# Patient Record
Sex: MALE | Race: WHITE | NOT HISPANIC OR LATINO | URBAN - METROPOLITAN AREA
[De-identification: names, ages, dates, MRNs, and addresses within clinical notes are randomized per-mention and may not be internally consistent; named-entity substitution may affect disease eponyms.]

---

## 2021-04-08 ENCOUNTER — OFFICE VISIT (OUTPATIENT)
Dept: URBAN - METROPOLITAN AREA CLINIC 98 | Facility: CLINIC | Age: 31
End: 2021-04-08

## 2021-04-08 DIAGNOSIS — R10.84 GENERALIZED ABDOMINAL PAIN: ICD-10-CM

## 2021-04-08 DIAGNOSIS — K50.80 CROHN'S COLITIS: ICD-10-CM

## 2021-04-08 DIAGNOSIS — R11.2 NAUSEA & VOMITING: ICD-10-CM

## 2021-04-08 DIAGNOSIS — R19.7 DIARRHEA, UNSPECIFIED TYPE: ICD-10-CM

## 2021-04-08 PROCEDURE — 99215 OFFICE O/P EST HI 40 MIN: CPT | Performed by: INTERNAL MEDICINE

## 2021-04-08 RX ORDER — USTEKINUMAB 90 MG/ML
INJECT 1 MILLILITER (90 MG) BY SUBCUTANEOUS ROUTE EVERY 8 WEEKS INJECTION, SOLUTION SUBCUTANEOUS
Qty: 1 | Refills: 5 | Status: ACTIVE | COMMUNITY
Start: 1900-01-01 | End: 1900-01-01

## 2021-04-08 RX ORDER — METHYLPREDNISOLONE 4 MG
TAKE AS DIRECTED TABLET, DOSE PACK ORAL
Qty: 1 | Refills: 0 | Status: ACTIVE | COMMUNITY
Start: 1900-01-01 | End: 1900-01-01

## 2021-04-08 RX ORDER — DICYCLOMINE HYDROCHLORIDE 10 MG/1
TAKE 1 CAPSULE (10 MG) BY ORAL ROUTE 2 TIMES PER DAY CAPSULE ORAL
Qty: 0 | Refills: 0 | Status: ACTIVE | COMMUNITY
Start: 1900-01-01 | End: 1900-01-01

## 2021-04-08 NOTE — HPI-TODAY'S VISIT:
29 yo male comes in for Crohn's f/u. Diarrhea, nausea, regurg, some pain, hemorroids. Started Stelara late 2017 after ileocecal resection.  Has [employer] insurance now.  10-12 stools a day  Trying to get a job.  Lives an hour and a half away.'  Last colonoscopy mid 2019.  He improves with the injection and then it wears off.  A few days the week before, syx improve. If he goes several times within an hour  Flagyl ointment. Wiping  Has never used prep H or annusol supp.  Has some abdominal pain. Eats and may be a trigger.  Last had blood work/labs.  Saw Dr. Dee Agustin in [place].   Monitoring the Stelara. Gave him vit.  Never had C diff.  Got [employer] insurance.     Last injection was last Friday. Not due for 7 weeks..  Range of stooling is 10-15.  Looking for work.  Got [employer] certificate and wants  job.   =========================== 3/4/19  Follow-up Crohn's Disease    History Of Present Illness  This is a scheduled appointment for this patient, a 28 year old /White male, after a previous visit approximately Crohn's disease.     27 yo male comes in for 3 month f/u.  Doing the same on Stelara as Cimzia.  He feels worse since his surgery.  Always going to br.  She says it is normal.  Typically 4-5 stools a day.  Was having 6-7 stools a day at last visit.  Usuially no nighttime stooling.  No blood in stool.  Daily abdominal pain, he thinks from scar and surgery and not from Crohn's.  More positional.  One resection.  Never had c diff colitis.  Been on Stelara for a year and good levels--- 7.3 and 9.8.     =====  12/3/18  27 yo male comes in for 3 month f/u ov.  not in remission.  6-7 stools a dfay. No blood.  REcent lightheaded ness and dizzy. Bad pain this past weekend. Gets abd pain uncommon.  May have been due to leftovers related to Thanksgiving.  Advised to use pepto if again occur  Taking Stelara 90 mg q 8 weeks. Dicyclomine 20 mg 1-2 qid prn.  nl lacto 6.59 and calpro less than16, so likely in remission.  await other labs.  ====Plan from 12/3/18 visit ------ stelara level. 8 labs. Turned in stool tests to [facility] 7 days ago.  Unable to find work because of syx and h/o Crohn's and encourage.  Try 1-2 dicyclomine prn or just qid and see if syx improved so he can focus on getting training.  Technical school ---- get a foundation and then more.  Asked for sleep med and suggested melatonin or to go to primary care.  1. Volunteer work 2. Part time 3. Help the elderly 4. Other options.   ===========  8/27/18 6-7 stools a day. doing better but not well.  Has had left sided abdominal pain on his left side.  Has talked to Dr. Luna.  Nausea, no vomiting.  taking bentyl - suggest 1-2 bid - qid  No pred and no pain meds.  Low vit d 27--- should be taking 10,000 u a day for 6 months or so and re  Stelara level was 7 at trough and is very good  =================================  26 YO male comes in for 3 month f/u.  Doing better.  Getting Stelara every 8 weeks.  Next dosee is tomorrow.  Has a rash on upper arm which concerns him  Today some mild pain that has resolved.  7 stools a day.  But quality is improved.  1 surgery 1 year ago by Dr. Luna   wAS OFF STELARA AN EXTRA MONTH      Past Medical History  Disease Name Date Onset Notes  Abdominal pain 01/29/2018 --   Anemia unk --   Anxiety and depression unk --   Asthma unk --   Back pain unk --   Constipation unk --   Crohn disease 06/06/2017 --   Hemorrhoids unk --   Hepatitis A Immunization unk --   Hepatitis B Immunization unk --   Influenza Immunization unk --   Nausea and vomiting unk --   Obesity unk --   Other unk MMR vaccination and chicken pox vaccine  Rectal bleeding unk --   Refused Screening for Hepatitis C unk --       Past Surgical History  Procedure Name Date Notes  Colon Surgery 5/2017 --   Colonoscopy 10/24/2017 03/04/2019 - 08/02/2018 - 05/02/2018 - 11/16/2017 -   EGD 10/24/2017 03/04/2019 - 08/02/2018 - 05/02/2018 - 11/16/2017 -       Medication List  Name Date Started Instructions  dicyclomine 10 mg oral capsule  take 1 capsule (10 mg) by oral route 2 times per day  Imodium Advanced oral  --   Stelara 90 mg/mL subcutaneous syringe 06/14/2017 inject 1 milliliter (90 mg) by subcutaneous route every 8 weeks for 60 days      Allergy List  Allergen Name Date Reaction Notes  NO KNOWN DRUG ALLERGIES --  --  --   No Known Food Allergies --  --  --   Cats --  --  --   Dander --  --  --       Family Medical History  Disease Name Relative/Age Notes  Family history of Crohn's disease Grandfather (maternal)/  --       Social History  Finding Status Start/Stop Quantity Notes  Alcohol Current some day --/-- --  03/04/2019 - 12/03/2018 - 08/02/2018 - 05/02/2018 - 01/29/2018 - 11/16/2017 - 08/16/2017 - 06/14/2017 - 06/06/2017 - 05/31/2017 - 03/30/2017 - 02/02/2017 - rarely   Denies illicit substance abuse --  --/-- --  03/04/2019 -   Denies substance abuse --  --/-- --  03/04/2019 -   Tobacco Former --/-- --  03/04/2019 - 12/03/2018 - 08/02/2018 - 05/02/2018 - 01/29/2018 - 11/16/2017 - 08/16/2017 - 06/14/2017 - 06/06/2017 - 05/31/2017 - 03/30/2017 - 02/02/2017 - as a kid       Review of Systems  ConstitutionalDenies : body aches, chills, fatigue, fever, loss of appetite, malaise, night sweats, weight gain, weight loss  EyesDenies : blurred vision, visual changes  HENTDenies : Ear Pain/Ringing, hearing loss, Mouth Ulcers/Sores, nose bleeds, problems with gums/teeth, trouble swallowing  CardiovascularDenies : chest pain, leaky heart valves, heart murmur, heart racing/skipping, high blood pressure, palpitations  RespiratoryDenies : chronic cough, shortness of breath, wheezing or asthma symptoms  GastrointestinalDenies : Abdominal Pain/discomfort, Anal/Rectal Pain or Itching, Anal Spasm, Black Stool, Bloating/belching/gaseouness, Change of Bowel Habit, Constipation, Diarrhea/Loose Stool, Difficulty in Swallowing, Heartburn/esophageal reflux, Hemorrhoids, Indigestion, Mucus in Stool, Nausea/vomiting, Rectal Bleeding, Unintentional Weight Loss  GenitourinaryDenies : Blood in Urine, Burning/pain with Urination, frequency, Recent/frequent UTI, Kidney Stones  IntegumentDenies : itching/dry skin, jaundice, rashes, bumps or sores  NeurologicDenies : headaches, dizziness/vertigo, head trauma/injury, recent numbness/weakness, seizures  MusculoskeletalDenies : back pain, decreased range of motion, joint pain/arthritis, Problems Walking/calf or leg pain  EndocrineDenies : bruise easily, excessive thirst, heat/cold intolerance, history of high or low blood sugar  PsychiatricDenies : anxiety, changes in sleep pattern, depression, loss of memory  Heme-LymphDenies : Bleeding Problems, Enlarged Nodes/Swolen Glands, excessive bruising, history of anemia  Allergic-ImmunologicDenies : seasonal allergies    Vitals  Date Time BP Position Site L\R Cuff Size HR RR TEMP (F) WT  HT  BMI kg/m2 BSA m2 O2 Sat      03/04/2019 10:40 /86 Sitting    120 - R  99.3 213lbs 16oz 5'  10" 30.71 2.19        Physical Examination  ConstitutionalAppearance : Well nourished, well developed patient in no distress. Ambulating without difficulty.  Ability to Communicate : Normal communication ability  EyesConjunctivae and Eyelids : Conjunctivae and eyelids appear normal  Sclerae : White without injection  HENTHead :  Inspection : Normocephalic and atraumatic  Face :  Inspection : Face within normal limits  Ears :  External Ears : External ears within normal limits  Nose/Nasopharynx :  External Nose : External nose normal appearance, nares patent, no nasal discharge  Mouth and Throat :  General : Oral cavity appearance normal, breath odor normal  Lips : Appearance normal  NeckInspection and Palpation : Normal appearance without tenderness on palpation or deformities, trachea midline  Thyroid : Normal size without tenderness, nodules or masses  Jugular Veins : no JVD  ChestInspection of Chest : No lesions, deformities or traumatic injuries present. No significant scars are present.  Respiratory Effort : Breathing is unlabored without accessory muscle use  Palpation of Chest : Chest wall non-tender with no masses present. Tactile fremitus is normal  Auscultation : Normal breath sounds  CardiovascularHeart :  Auscultation : Heart rate is regular with normal rhythm. No murmurs are heard. No edema.  GastrointestinalAbdominal Exam : Abdomen soft without rigidity or guarding, abdomen non-tender to palpation, normal bowel sounds, no masses present, non-distended  Liver and Spleen : No hepatomegaly present. Liver is non-tender to palpation and spleen is not palpable.  LymphaticNeck : No lymphadenopathy present  Axillae : No lymphadenopathy present  Groin : No lymphadenopathy present  MusculoskeletalGait and Station : Normal Gait, normal station  Neck/Spine/Pelvis : No tenderness of deformities present.  SkinGeneral Inspection : No rashes, lesions or areas of discoloration present. Skin turgor is normal.  General Palpation : No abnormalities, masses or tenderness on palpation.  Neurologic and PsychiatricOrientation : Oriented to person, place and time  Sensation : Normal sensation  Memory : Short and long term memory intact.  Mood and Affect : Normal mood with an appropriate affect      Assessment  Crohn's Disease     555.2  Abdominal pain     789.00/R10.9  Crohn disease     555.9/K50.90    Plan  OrdersPatient not identified as an unhealthy alcohol user when screene () - - 03/04/2019  Influenza immunization administered or previously received () - - 03/04/2019  BMI screening above normal () - - 03/04/2019  Current tobacco non-user (CAD, cap, COPD, PV) (dm) (IBD) (1036F, ) - - 03/04/2019  Colorectal cancer screening results documented and reviewed (PV) (3017F) - - 03/04/2019  Documentation of current medications. () - - 03/04/2019  InstructionsI have documented a list of current medications and reviewed it with the patient.  I encouraged the patient to diet and exercise.  DispositionReturn To Clinic in 6 Months  CorrespondenceCC this document (Andres Villatoro DO) - 3/4/2019    Pepto Bismol prn.  Education and/or work opportuhities, 4 days a week, working on titles.  On line and other oppotunities.  No charge at next f/u while on Amb[employer].   6/15/19 Addendum 6-7 stools a day mid and left sided abdominal pain tht is moderate   EIM ---- Joint aches- knees and shoulder no foot or ankle         Electronically Signed by: Adan Serrano MD -Author on Hailee 15, 2019 01:04:52 PM

## 2021-04-09 LAB
A/G RATIO: 1.8
ALBUMIN: 4.4
ALKALINE PHOSPHATASE: 75
ALT (SGPT): 22
AST (SGOT): 15
BASO (ABSOLUTE): 0.1
BASOS: 1
BILIRUBIN, TOTAL: 0.3
BUN/CREATININE RATIO: 7
BUN: 8
C-REACTIVE PROTEIN, QUANT: 3
CALCIUM: 9.7
CARBON DIOXIDE, TOTAL: 23
CHLORIDE: 102
CREATININE: 1.11
EGFR IF AFRICN AM: 102
EGFR IF NONAFRICN AM: 88
EOS (ABSOLUTE): 0.1
EOS: 1
FERRITIN, SERUM: 50
FOLATE (FOLIC ACID), SERUM: 8.7
GLOBULIN, TOTAL: 2.4
GLUCOSE: 80
HEMATOCRIT: 50.8
HEMATOLOGY COMMENTS:: (no result)
HEMOGLOBIN: 16.7
IMMATURE CELLS: (no result)
IMMATURE GRANS (ABS): 0
IMMATURE GRANULOCYTES: 0
IRON BIND.CAP.(TIBC): 333
IRON SATURATION: 28
IRON: 92
LYMPHS (ABSOLUTE): 2
LYMPHS: 26
MCH: 31.1
MCHC: 32.9
MCV: 95
MONOCYTES(ABSOLUTE): 0.7
MONOCYTES: 9
NEUTROPHILS (ABSOLUTE): 4.8
NEUTROPHILS: 63
NRBC: (no result)
PLATELETS: 287
POTASSIUM: 3.8
PROTEIN, TOTAL: 6.8
RBC: 5.37
RDW: 12.5
SEDIMENTATION RATE-WESTERGREN: 2
SODIUM: 142
UIBC: 241
VITAMIN B12: 662
VITAMIN D, 25-HYDROXY: 52.1
WBC: 7.6

## 2021-04-16 ENCOUNTER — LAB OUTSIDE AN ENCOUNTER (OUTPATIENT)
Dept: URBAN - METROPOLITAN AREA CLINIC 98 | Facility: CLINIC | Age: 31
End: 2021-04-16

## 2021-04-22 LAB
C DIFFICILE TOXIN GENE NAA: NEGATIVE
CALPROTECTIN, FECAL: 64
CAMPYLOBACTER CULTURE: (no result)
E COLI SHIGA TOXIN EIA: NEGATIVE
LACTOFERRIN, FECAL, QUANT.: 8.98
Lab: (no result)
OVA + PARASITE EXAM: (no result)
SALMONELLA/SHIGELLA SCREEN: (no result)

## 2021-09-09 ENCOUNTER — TELEPHONE ENCOUNTER (OUTPATIENT)
Dept: URBAN - METROPOLITAN AREA CLINIC 98 | Facility: CLINIC | Age: 31
End: 2021-09-09

## 2021-09-09 RX ORDER — USTEKINUMAB 90 MG/ML
INJECT 90 MG INJECTION, SOLUTION SUBCUTANEOUS
Qty: 1 SYRINGE | Refills: 6

## 2021-09-09 RX ORDER — USTEKINUMAB 90 MG/ML
INJECT 90 MG INJECTION, SOLUTION SUBCUTANEOUS
Qty: 1 SYRINGE | Refills: 6 | OUTPATIENT
Start: 2021-09-09 | End: 2022-11-03

## 2021-12-17 ENCOUNTER — TELEPHONE ENCOUNTER (OUTPATIENT)
Dept: URBAN - METROPOLITAN AREA CLINIC 98 | Facility: CLINIC | Age: 31
End: 2021-12-17

## 2021-12-17 RX ORDER — USTEKINUMAB 90 MG/ML
INJECT 90 MG INJECTION, SOLUTION SUBCUTANEOUS
Qty: 1 SYRINGE | Refills: 6
Start: 2021-09-09 | End: 2023-02-10

## 2021-12-17 RX ORDER — USTEKINUMAB 90 MG/ML
INJECT 90 MG INJECTION, SOLUTION SUBCUTANEOUS
Qty: 1 SYRINGE | Refills: 6

## 2023-01-03 ENCOUNTER — TELEPHONE ENCOUNTER (OUTPATIENT)
Dept: URBAN - METROPOLITAN AREA CLINIC 98 | Facility: CLINIC | Age: 33
End: 2023-01-03

## 2023-01-03 RX ORDER — USTEKINUMAB 90 MG/ML
INJECT 90 MG INJECTION, SOLUTION SUBCUTANEOUS
Qty: 1 SYRINGE | Refills: 6
Start: 2021-09-09 | End: 2024-02-27

## 2023-01-12 ENCOUNTER — OFFICE VISIT (OUTPATIENT)
Dept: URBAN - METROPOLITAN AREA TELEHEALTH 2 | Facility: TELEHEALTH | Age: 33
End: 2023-01-12

## 2023-01-12 ENCOUNTER — DASHBOARD ENCOUNTERS (OUTPATIENT)
Age: 33
End: 2023-01-12

## 2023-01-12 VITALS — HEIGHT: 70 IN | WEIGHT: 214 LBS | BODY MASS INDEX: 30.64 KG/M2

## 2023-01-12 VITALS — BODY MASS INDEX: 30.64 KG/M2 | WEIGHT: 214 LBS | HEIGHT: 70 IN

## 2023-01-12 DIAGNOSIS — R19.7 DIARRHEA, UNSPECIFIED TYPE: ICD-10-CM

## 2023-01-12 DIAGNOSIS — K50.80 CROHN'S COLITIS: ICD-10-CM

## 2023-01-12 DIAGNOSIS — R11.2 NAUSEA & VOMITING: ICD-10-CM

## 2023-01-12 PROCEDURE — 99214 OFFICE O/P EST MOD 30 MIN: CPT | Performed by: INTERNAL MEDICINE

## 2023-01-12 RX ORDER — USTEKINUMAB 90 MG/ML
INJECT 90 MG INJECTION, SOLUTION SUBCUTANEOUS
Qty: 1 SYRINGE | Refills: 6 | Status: ACTIVE | COMMUNITY
Start: 2021-09-09 | End: 2024-02-27

## 2023-01-12 RX ORDER — DICYCLOMINE HYDROCHLORIDE 10 MG/1
TAKE 1 CAPSULE (10 MG) BY ORAL ROUTE 2 TIMES PER DAY CAPSULE ORAL
Qty: 0 | Refills: 0 | Status: ACTIVE | COMMUNITY
Start: 1900-01-01

## 2023-01-12 NOTE — HPI-TODAY'S VISIT:
33 yo male with severe Crohn's disease on Stelara 90 mg q 8 w and next dose due Feb 3.  Needs re-authorization.  No medical f/u over the last 1.75 years.  Doing well.  Only 2 episodes of abd pain in a year.  Stooling is still 10-12 per day. No blood in stool. No pain with stooling. Wiping may produce irritation.  Had 1 resection and fistula repair in 2017.  Reminded that he needs regular folliow up.  It is possible to do face to face visits- he will need hear or [place] or nearer to hium.  No other medical issues.  He has had some interviews.  He is not working at this time.  Started Stelara in 2020 and insurance is still ambetter----- letter says he has approval for 2023.    ------------------------------------------------ 4/8/21  31 yo male comes in for Crohn's f/u. Diarrhea, nausea, regurg, some pain, hemorroids. Started Stelara late 2017 after ileocecal resection.  Has [employer] insurance now.  10-12 stools a day  Trying to get a job.  Lives an hour and a half away.'  Last colonoscopy mid 2019.  He improves with the injection and then it wears off.  A few days the week before, syx improve. If he goes several times within an hour  Flagyl ointment. Wiping  Has never used prep H or annusol supp.  Has some abdominal pain. Eats and may be a trigger.  Last had blood work/labs.  Saw Dr. Dee Agustin in [place].   Monitoring the Stelara. Gave him vit.  Never had C diff.  Got [employer] insurance.     Last injection was last Friday. Not due for 7 weeks..  Range of stooling is 10-15.  Looking for work.  Got [employer] certificate and wants  job.   =========================== 3/4/19  Follow-up Crohn's Disease    History Of Present Illness  This is a scheduled appointment for this patient, a 28 year old /White male, after a previous visit approximately Crohn's disease.     27 yo male comes in for 3 month f/u.  Doing the same on Stelara as Cimzia.  He feels worse since his surgery.  Always going to br.  She says it is normal.  Typically 4-5 stools a day.  Was having 6-7 stools a day at last visit.  Usuially no nighttime stooling.  No blood in stool.  Daily abdominal pain, he thinks from scar and surgery and not from Crohn's.  More positional.  One resection.  Never had c diff colitis.  Been on Stelara for a year and good levels--- 7.3 and 9.8.     =====  12/3/18  27 yo male comes in for 3 month f/u ov.  not in remission.  6-7 stools a dfay. No blood.  REcent lightheaded ness and dizzy. Bad pain this past weekend. Gets abd pain uncommon.  May have been due to leftovers related to Thanksgiving.  Advised to use pepto if again occur  Taking Stelara 90 mg q 8 weeks. Dicyclomine 20 mg 1-2 qid prn.  nl lacto 6.59 and calpro less than16, so likely in remission.  await other labs.  ====Plan from 12/3/18 visit ------ stelara level. 8 labs. Turned in stool tests to [facility] 7 days ago.  Unable to find work because of syx and h/o Crohn's and encourage.  Try 1-2 dicyclomine prn or just qid and see if syx improved so he can focus on getting training.  Technical school ---- get a foundation and then more.  Asked for sleep med and suggested melatonin or to go to primary care.  1. Volunteer work 2. Part time 3. Help the elderly 4. Other options.   ===========  8/27/18 6-7 stools a day. doing better but not well.  Has had left sided abdominal pain on his left side.  Has talked to Dr. Luna.  Nausea, no vomiting.  taking bentyl - suggest 1-2 bid - qid  No pred and no pain meds.  Low vit d 27--- should be taking 10,000 u a day for 6 months or so and re  Stelara level was 7 at trough and is very good  =================================  28 YO male comes in for 3 month f/u.  Doing better.  Getting Stelara every 8 weeks.  Next dosee is tomorrow.  Has a rash on upper arm which concerns him  Today some mild pain that has resolved.  7 stools a day.  But quality is improved.  1 surgery 1 year ago by Dr. Luna   wAS OFF STELARA AN EXTRA MONTH      Past Medical History  Disease Name Date Onset Notes  Abdominal pain 01/29/2018 --   Anemia unk --   Anxiety and depression unk --   Asthma unk --   Back pain unk --   Constipation unk --   Crohn disease 06/06/2017 --   Hemorrhoids unk --   Hepatitis A Immunization unk --   Hepatitis B Immunization unk --   Influenza Immunization unk --   Nausea and vomiting unk --   Obesity unk --   Other unk MMR vaccination and chicken pox vaccine  Rectal bleeding unk --   Refused Screening for Hepatitis C unk --       Past Surgical History  Procedure Name Date Notes  Colon Surgery 5/2017 --   Colonoscopy 10/24/2017 03/04/2019 - 08/02/2018 - 05/02/2018 - 11/16/2017 -   EGD 10/24/2017 03/04/2019 - 08/02/2018 - 05/02/2018 - 11/16/2017 -       Medication List  Name Date Started Instructions  dicyclomine 10 mg oral capsule  take 1 capsule (10 mg) by oral route 2 times per day  Imodium Advanced oral  --   Stelara 90 mg/mL subcutaneous syringe 06/14/2017 inject 1 milliliter (90 mg) by subcutaneous route every 8 weeks for 60 days      Allergy List  Allergen Name Date Reaction Notes  NO KNOWN DRUG ALLERGIES --  --  --   No Known Food Allergies --  --  --   Cats --  --  --   Dander --  --  --       Family Medical History  Disease Name Relative/Age Notes  Family history of Crohn's disease Grandfather (maternal)/  --       Social History  Finding Status Start/Stop Quantity Notes  Alcohol Current some day --/-- --  03/04/2019 - 12/03/2018 - 08/02/2018 - 05/02/2018 - 01/29/2018 - 11/16/2017 - 08/16/2017 - 06/14/2017 - 06/06/2017 - 05/31/2017 - 03/30/2017 - 02/02/2017 - rarely   Denies illicit substance abuse --  --/-- --  03/04/2019 -   Denies substance abuse --  --/-- --  03/04/2019 -   Tobacco Former --/-- --  03/04/2019 - 12/03/2018 - 08/02/2018 - 05/02/2018 - 01/29/2018 - 11/16/2017 - 08/16/2017 - 06/14/2017 - 06/06/2017 - 05/31/2017 - 03/30/2017 - 02/02/2017 - as a kid       Review of Systems  ConstitutionalDenies : body aches, chills, fatigue, fever, loss of appetite, malaise, night sweats, weight gain, weight loss  EyesDenies : blurred vision, visual changes  HENTDenies : Ear Pain/Ringing, hearing loss, Mouth Ulcers/Sores, nose bleeds, problems with gums/teeth, trouble swallowing  CardiovascularDenies : chest pain, leaky heart valves, heart murmur, heart racing/skipping, high blood pressure, palpitations  RespiratoryDenies : chronic cough, shortness of breath, wheezing or asthma symptoms  GastrointestinalDenies : Abdominal Pain/discomfort, Anal/Rectal Pain or Itching, Anal Spasm, Black Stool, Bloating/belching/gaseouness, Change of Bowel Habit, Constipation, Diarrhea/Loose Stool, Difficulty in Swallowing, Heartburn/esophageal reflux, Hemorrhoids, Indigestion, Mucus in Stool, Nausea/vomiting, Rectal Bleeding, Unintentional Weight Loss  GenitourinaryDenies : Blood in Urine, Burning/pain with Urination, frequency, Recent/frequent UTI, Kidney Stones  IntegumentDenies : itching/dry skin, jaundice, rashes, bumps or sores  NeurologicDenies : headaches, dizziness/vertigo, head trauma/injury, recent numbness/weakness, seizures  MusculoskeletalDenies : back pain, decreased range of motion, joint pain/arthritis, Problems Walking/calf or leg pain  EndocrineDenies : bruise easily, excessive thirst, heat/cold intolerance, history of high or low blood sugar  PsychiatricDenies : anxiety, changes in sleep pattern, depression, loss of memory  Heme-LymphDenies : Bleeding Problems, Enlarged Nodes/Swolen Glands, excessive bruising, history of anemia  Allergic-ImmunologicDenies : seasonal allergies    Vitals  Date Time BP Position Site L\R Cuff Size HR RR TEMP (F) WT  HT  BMI kg/m2 BSA m2 O2 Sat HC     03/04/2019 10:40 /86 Sitting    120 - R  99.3 213lbs 16oz 5'  10" 30.71 2.19        Physical Examination  ConstitutionalAppearance : Well nourished, well developed patient in no distress. Ambulating without difficulty.  Ability to Communicate : Normal communication ability  EyesConjunctivae and Eyelids : Conjunctivae and eyelids appear normal  Sclerae : White without injection  HENTHead :  Inspection : Normocephalic and atraumatic  Face :  Inspection : Face within normal limits  Ears :  External Ears : External ears within normal limits  Nose/Nasopharynx :  External Nose : External nose normal appearance, nares patent, no nasal discharge  Mouth and Throat :  General : Oral cavity appearance normal, breath odor normal  Lips : Appearance normal  NeckInspection and Palpation : Normal appearance without tenderness on palpation or deformities, trachea midline  Thyroid : Normal size without tenderness, nodules or masses  Jugular Veins : no JVD  ChestInspection of Chest : No lesions, deformities or traumatic injuries present. No significant scars are present.  Respiratory Effort : Breathing is unlabored without accessory muscle use  Palpation of Chest : Chest wall non-tender with no masses present. Tactile fremitus is normal  Auscultation : Normal breath sounds  CardiovascularHeart :  Auscultation : Heart rate is regular with normal rhythm. No murmurs are heard. No edema.  GastrointestinalAbdominal Exam : Abdomen soft without rigidity or guarding, abdomen non-tender to palpation, normal bowel sounds, no masses present, non-distended  Liver and Spleen : No hepatomegaly present. Liver is non-tender to palpation and spleen is not palpable.  LymphaticNeck : No lymphadenopathy present  Axillae : No lymphadenopathy present  Groin : No lymphadenopathy present  MusculoskeletalGait and Station : Normal Gait, normal station  Neck/Spine/Pelvis : No tenderness of deformities present.  SkinGeneral Inspection : No rashes, lesions or areas of discoloration present. Skin turgor is normal.  General Palpation : No abnormalities, masses or tenderness on palpation.  Neurologic and PsychiatricOrientation : Oriented to person, place and time  Sensation : Normal sensation  Memory : Short and long term memory intact.  Mood and Affect : Normal mood with an appropriate affect      Assessment  Crohn's Disease     555.2  Abdominal pain     789.00/R10.9  Crohn disease     555.9/K50.90    Plan  OrdersPatient not identified as an unhealthy alcohol user when screene () - - 03/04/2019  Influenza immunization administered or previously received () - - 03/04/2019  BMI screening above normal () - - 03/04/2019  Current tobacco non-user (CAD, cap, COPD, PV) (dm) (IBD) (1036F, ) - - 03/04/2019  Colorectal cancer screening results documented and reviewed (PV) (3017F) - - 03/04/2019  Documentation of current medications. () - - 03/04/2019  InstructionsI have documented a list of current medications and reviewed it with the patient.  I encouraged the patient to diet and exercise.  DispositionReturn To Clinic in 6 Months  CorrespondenceCC this document (Andres Villatoro DO) - 3/4/2019    Pepto Bismol prn.  Education and/or work opportuhities, 4 days a week, working on titles.  On line and other oppotunities.  No charge at next f/u while on Ambetter.   6/15/19 Addendum 6-7 stools a day mid and left sided abdominal pain tht is moderate   EIM ---- Joint aches- knees and shoulder no foot or ankle         Electronically Signed by: Adan Serrano MD -Author on Hailee 15, 2019 0

## 2023-01-20 LAB
C DIFFICILE TOXIN GENE NAA: NEGATIVE
C DIFFICILE TOXINS A+B, EIA: NEGATIVE
CALPROTECTIN, FECAL: 116
CAMPYLOBACTER CULTURE: (no result)
E COLI SHIGA TOXIN EIA: NEGATIVE
LACTOFERRIN, FECAL, QUANT.: 7.85
Lab: (no result)
SALMONELLA/SHIGELLA SCREEN: (no result)
STOOL CULTURE, YERSINIA ONLY: (no result)

## 2023-01-24 ENCOUNTER — TELEPHONE ENCOUNTER (OUTPATIENT)
Dept: URBAN - METROPOLITAN AREA CLINIC 98 | Facility: CLINIC | Age: 33
End: 2023-01-24

## 2023-01-24 ENCOUNTER — TELEPHONE ENCOUNTER (OUTPATIENT)
Dept: URBAN - METROPOLITAN AREA CLINIC 92 | Facility: CLINIC | Age: 33
End: 2023-01-24

## 2023-01-24 RX ORDER — USTEKINUMAB 90 MG/ML
INJECT 90 MG INJECTION, SOLUTION SUBCUTANEOUS
Qty: 1 SYRINGE | Refills: 6
Start: 2021-09-09 | End: 2024-03-19

## 2023-01-26 LAB
A/G RATIO: 2.1
ALBUMIN: 4.8
ALKALINE PHOSPHATASE: 87
ALT (SGPT): 19
ANTI-USTEKINUMAB ANTIBODY: <40
AST (SGOT): 12
BASO (ABSOLUTE): 0.1
BASOS: 1
BILIRUBIN, TOTAL: <0.2
BUN/CREATININE RATIO: 14
BUN: 16
C-REACTIVE PROTEIN, QUANT: 1
CALCIUM: 9.9
CARBON DIOXIDE, TOTAL: 19
CHLORIDE: 104
CREATININE: 1.15
EGFR: 86
EOS (ABSOLUTE): 0.3
EOS: 4
FERRITIN, SERUM: 21
FOLATE (FOLIC ACID), SERUM: 8.3
GLOBULIN, TOTAL: 2.3
GLUCOSE: 93
HBSAG SCREEN: NEGATIVE
HEMATOCRIT: 47.6
HEMATOLOGY COMMENTS:: (no result)
HEMOGLOBIN: 16.1
HEP B CORE AB, IGM: NEGATIVE
HEP B CORE AB, TOT: NEGATIVE
HEP B SURFACE AB, QUAL: REACTIVE
IMMATURE CELLS: (no result)
IMMATURE GRANS (ABS): 0
IMMATURE GRANULOCYTES: 0
IRON BIND.CAP.(TIBC): 394
IRON SATURATION: 10
IRON: 40
LYMPHS (ABSOLUTE): 2.2
LYMPHS: 25
Lab: (no result)
MCH: 29.8
MCHC: 33.8
MCV: 88
MONOCYTES(ABSOLUTE): 0.7
MONOCYTES: 7
NEUTROPHILS (ABSOLUTE): 5.6
NEUTROPHILS: 63
NRBC: (no result)
PLATELETS: 349
POTASSIUM: 4.1
PROTEIN, TOTAL: 7.1
QUANTIFERON CRITERIA: (no result)
QUANTIFERON INCUBATION: (no result)
QUANTIFERON MITOGEN VALUE: >10
QUANTIFERON NIL VALUE: 0
QUANTIFERON TB1 AG VALUE: 0
QUANTIFERON TB2 AG VALUE: 0
QUANTIFERON-TB GOLD PLUS: NEGATIVE
RBC: 5.4
RDW: 13.1
SEDIMENTATION RATE-WESTERGREN: 3
SODIUM: 141
UIBC: 354
USTEKINUMAB: 6.6
VITAMIN B12: 706
WBC: 8.9

## 2023-09-05 ENCOUNTER — TELEPHONE ENCOUNTER (OUTPATIENT)
Dept: URBAN - METROPOLITAN AREA CLINIC 98 | Facility: CLINIC | Age: 33
End: 2023-09-05

## 2023-09-05 RX ORDER — USTEKINUMAB 90 MG/ML
INJECT 90 MG INJECTION, SOLUTION SUBCUTANEOUS
Qty: 1 EACH | Refills: 9
Start: 2021-09-09 | End: 2025-03-18

## 2023-09-12 ENCOUNTER — TELEPHONE ENCOUNTER (OUTPATIENT)
Dept: URBAN - METROPOLITAN AREA CLINIC 98 | Facility: CLINIC | Age: 33
End: 2023-09-12

## 2023-09-12 RX ORDER — USTEKINUMAB 90 MG/ML
INJECT 90 MG INJECTION, SOLUTION SUBCUTANEOUS
Qty: 1 EACH | Refills: 9
Start: 2021-09-09 | End: 2025-03-25

## 2023-12-11 ENCOUNTER — TELEPHONE ENCOUNTER (OUTPATIENT)
Dept: URBAN - METROPOLITAN AREA CLINIC 98 | Facility: CLINIC | Age: 33
End: 2023-12-11

## 2023-12-11 RX ORDER — USTEKINUMAB 90 MG/ML
INJECT 90 MG INJECTION, SOLUTION SUBCUTANEOUS
Qty: 1 EACH | Refills: 9
Start: 2021-09-09 | End: 2025-06-23

## 2024-10-07 ENCOUNTER — TELEPHONE ENCOUNTER (OUTPATIENT)
Dept: URBAN - METROPOLITAN AREA CLINIC 98 | Facility: CLINIC | Age: 34
End: 2024-10-07

## 2024-10-07 RX ORDER — USTEKINUMAB 90 MG/ML
INJECT 90 MG INJECTION, SOLUTION SUBCUTANEOUS
Qty: 1 EACH | Refills: 9
Start: 2021-09-09 | End: 2026-04-20

## 2024-10-07 RX ORDER — USTEKINUMAB 90 MG/ML
INJECT 90 MG INJECTION, SOLUTION SUBCUTANEOUS
Qty: 1 EACH | Refills: 8

## 2025-03-28 ENCOUNTER — TELEPHONE ENCOUNTER (OUTPATIENT)
Dept: URBAN - METROPOLITAN AREA CLINIC 98 | Facility: CLINIC | Age: 35
End: 2025-03-28

## 2025-03-28 RX ORDER — USTEKINUMAB 90 MG/ML
INJECT 90 MG INJECTION, SOLUTION SUBCUTANEOUS
Qty: 1 EACH | Refills: 8
End: 2026-08-14